# Patient Record
Sex: MALE | Race: WHITE | Employment: FULL TIME | URBAN - METROPOLITAN AREA
[De-identification: names, ages, dates, MRNs, and addresses within clinical notes are randomized per-mention and may not be internally consistent; named-entity substitution may affect disease eponyms.]

---

## 2021-01-22 ENCOUNTER — OFFICE VISIT (OUTPATIENT)
Dept: ENT CLINIC | Age: 26
End: 2021-01-22
Payer: COMMERCIAL

## 2021-01-22 ENCOUNTER — PROCEDURE VISIT (OUTPATIENT)
Dept: AUDIOLOGY | Age: 26
End: 2021-01-22
Payer: COMMERCIAL

## 2021-01-22 VITALS
TEMPERATURE: 96.8 F | HEART RATE: 97 BPM | WEIGHT: 140 LBS | BODY MASS INDEX: 22.5 KG/M2 | SYSTOLIC BLOOD PRESSURE: 139 MMHG | DIASTOLIC BLOOD PRESSURE: 71 MMHG | HEIGHT: 66 IN

## 2021-01-22 DIAGNOSIS — H93.11 TINNITUS, RIGHT EAR: ICD-10-CM

## 2021-01-22 DIAGNOSIS — H93.11 TINNITUS OF RIGHT EAR: ICD-10-CM

## 2021-01-22 DIAGNOSIS — H90.41 SENSORINEURAL HEARING LOSS (SNHL) OF RIGHT EAR WITH UNRESTRICTED HEARING OF LEFT EAR: Primary | ICD-10-CM

## 2021-01-22 DIAGNOSIS — H91.21 SUDDEN IDIOPATHIC HEARING LOSS OF RIGHT EAR WITH UNRESTRICTED HEARING OF LEFT EAR: Primary | ICD-10-CM

## 2021-01-22 PROCEDURE — 92550 TYMPANOMETRY & REFLEX THRESH: CPT | Performed by: AUDIOLOGIST

## 2021-01-22 PROCEDURE — 92557 COMPREHENSIVE HEARING TEST: CPT | Performed by: AUDIOLOGIST

## 2021-01-22 PROCEDURE — 99203 OFFICE O/P NEW LOW 30 MIN: CPT | Performed by: OTOLARYNGOLOGY

## 2021-01-22 RX ORDER — METHYLPREDNISOLONE 4 MG/1
TABLET ORAL
COMMUNITY
Start: 2021-01-18 | End: 2021-01-29 | Stop reason: ALTCHOICE

## 2021-01-22 RX ORDER — DEXTROAMPHETAMINE SACCHARATE, AMPHETAMINE ASPARTATE, DEXTROAMPHETAMINE SULFATE AND AMPHETAMINE SULFATE 3.75; 3.75; 3.75; 3.75 MG/1; MG/1; MG/1; MG/1
15 TABLET ORAL DAILY
COMMUNITY
End: 2021-01-22

## 2021-01-22 RX ORDER — DEXTROAMPHETAMINE SACCHARATE, AMPHETAMINE ASPARTATE MONOHYDRATE, DEXTROAMPHETAMINE SULFATE AND AMPHETAMINE SULFATE 3.75; 3.75; 3.75; 3.75 MG/1; MG/1; MG/1; MG/1
15 CAPSULE, EXTENDED RELEASE ORAL EVERY MORNING
COMMUNITY
Start: 2021-02-18 | End: 2021-03-20

## 2021-01-22 RX ORDER — PREDNISONE 20 MG/1
TABLET ORAL
Qty: 33 TABLET | Refills: 0 | Status: SHIPPED | OUTPATIENT
Start: 2021-01-22 | End: 2021-02-09 | Stop reason: ALTCHOICE

## 2021-01-22 SDOH — HEALTH STABILITY: MENTAL HEALTH: HOW MANY STANDARD DRINKS CONTAINING ALCOHOL DO YOU HAVE ON A TYPICAL DAY?: NOT ASKED

## 2021-01-22 SDOH — HEALTH STABILITY: MENTAL HEALTH: HOW OFTEN DO YOU HAVE A DRINK CONTAINING ALCOHOL?: NOT ASKED

## 2021-01-22 ASSESSMENT — ENCOUNTER SYMPTOMS
SHORTNESS OF BREATH: 0
SORE THROAT: 0
TROUBLE SWALLOWING: 0
FACIAL SWELLING: 0
EYE ITCHING: 0
APNEA: 0
COUGH: 0
VOICE CHANGE: 0
SINUS PRESSURE: 0

## 2021-01-22 NOTE — PROGRESS NOTES
Alexa Baker   1995, 30 y.o. male   [id number]       Referring Provider: Consuelo Ly DO  Referral Type: In an order in [address] [place] [address]    Reason for Visit: Evaluation of suspected change in hearing and tinnitus. ADULT AUDIOLOGIC EVALUATION      Alexa Baker is a 30 y.o. male seen today, 1/22/2021 , for an initial audiologic evaluation. Patient was seen by Consuelo Ly DO following today's evaluation. AUDIOLOGIC AND OTHER PERTINENT MEDICAL HISTORY:      Alexa Baker noted sudden decrease in hearing and onset of tinnitus in the right ear ~1 week ago. Patient notes no improvement of symptoms with medical management recommended by primary gisele physician. He denies concerns with left ear at this time. No additional significant otologic or medical history was reported. Alexa Baker denied otalgia, aural fullness, otorrhea, dizziness, imbalance, history of falls, history of occupational/recreational noise exposure, history of head trauma, history of ear surgery and family history of hearing loss. Date: 1/22/2021     IMPRESSIONS:      Today's results revealed asymmetric sensorineural hearing loss and word recognition with right (0%) worse than left (100%). Asymmetries > 10 dBHL noted from 250-8000Hz. Acoustic reflex pattern consistent with sensorineural hearing loss of the right ear. Follow medical recommendations of Consuelo Ly DO.     ASSESSMENT AND FINDINGS:     Otoscopy revealed: Clear ear canals bilaterally    RIGHT EAR:  Hearing Sensitivity: Profound sensorineural hearing loss through 2kHz rising to severe sensorineural hearing loss from 3-8kHz. Speech Recognition Threshold: 85 dB HL  Word Recognition: Very Poor (0%), based on NU-6 25-word list at 105 dBHL masked (limits of audiometer) using recorded speech stimuli. Tympanometry: Normal peak pressure and compliance, Type A tympanogram, consistent with normal middle ear function. Acoustic Reflexes: Ipsilateral: Absent.  Contralateral: Present at normal sensation levels. LEFT EAR:  Hearing Sensitivity: Within normal limits. Speech Recognition Threshold: 5 dB HL  Word Recognition: Excellent (100%), based on NU-6 25-word list at 50 dBHL using recorded speech stimuli. Tympanometry: Normal peak pressure and compliance, Type A tympanogram, consistent with normal middle ear function. Acoustic Reflexes: Ipsilateral: Present at normal sensation levels. Contralateral: Absent. Reliability: Good  Transducer: Inserts    **NOTE: Asymmetries > 10 dBHL noted from 250-8000Hz and in worse recognition with right ear worse than left. Acoustic reflex pattern consistent with sensorineural hearing loss of the right ear. See scanned audiogram dated 1/22/2021  for results. PATIENT EDUCATION:       The following items were discussed with the patient:    - Good Communication Strategies    - Tinnitus Management Strategies    Educational information was shared in the After Visit Summary. RECOMMENDATIONS:                                                                                                                                                                                                                                                            The following items are recommended based on patient report and results from today's appointment:   - Continue medical follow-up with Nancy Mast DO.   - Retest hearing as medically indicated and/or sooner if a change in hearing is noted. - Utilize \"Good Communication Strategies\" as discussed to assist in speech understanding with communication partners. - Maintain a sound enriched environment to assist in the management of tinnitus symptoms.          Rasta Robb  Audiologist    Chart CC'd to: Nancy Mast DO      Degree of   Hearing Sensitivity dB Range   Within Normal Limits (WNL) 0 - 20   Mild 20 - 40   Moderate 40 - 55   Moderately-Severe 55 - 70   Severe

## 2021-01-22 NOTE — PATIENT INSTRUCTIONS
Good Communication Strategies    Communication can be challenging for anyone, but can be especially difficult for those with some degree of hearing loss. While we may not be able to control every factor that may lead to difficulty with communication, there are Good Communication Strategies that we can all use in our day-to-day lives. Communication takes both parties working together for it to be successful. Tips as a Listener:   1. Control your environment. It is important to limit the amount of background noise in the room when possible. You should also consider having a good light source in the room to best see the other person. 2. Ask for clarification. Instead of saying \"What?\", you can use parts of what you heard to make a new question. For example, if you heard the word \"Thursday\" but not the rest of the week, you may ask \"What was that about Thursday? \" or \"What did you want to do Thursday? \". This shows the person talking that you are listening and will help them better explain what they are saying. 3. Be an advocate for yourself. If you are hearing but not understanding, tell the other person \"I can hear you, but I need you to slow down when you speak. \"  Or if someone is facing the other direction, say \"I cannot hear you when you are not looking at me when we talk. \"       Tips as a Talker:   - Sit or stand 3 to 6 feet away to maximize audibility         -- It is unrealistic to believe someone else will fully hear your message if you are speaking from across the room or in a different room in the house   - Stay at eye level to help with visual cues   - Make sure you have the persons attention before speaking   - Use facial expressions and gestures to accentuate your message   - Raise your voice slightly (do not scream)   - Speak slowly and distinctly   - Use short, simple sentences   - Rephrase your words if the person is having a hard time understanding you    - To avoid distortion, dont speak directly into a persons ear      Some additional items that may be helpful:   - Remain patient - this is important for both parties   - Write down items that still cannot be heard/understood. You may write with pen/paper or consider typing/texting on a cell phone or smart device. - If background noise is unavoidable, try to keep yourself in a good position in the room. By sitting at a morales on the side of the restaurant (preferably a corner), it will be easier to communicate than if you were sitting at a table in the middle with background noise surrounding you. Keep yourself positioned away from music speakers or heavy foot traffic. Tinnitus: Overview and Management Strategies          Many people have some ringing sounds in their ears once in a while. You may hear a roar, a hiss, a tinkle, or a buzz. The sound usually lasts only a few minutes. If it goes on all the time, you may have tinnitus. Tinnitus is usually caused by long-term exposure to loud noise. This damages the nerves in the inner ear. It can occur with all types of hearing loss. It may be a symptom of almost any ear problem. Tinnitus may be caused by a buildup of earwax. Or, it may be caused by ear infections or certain medicines (especially antibiotics or large amounts of aspirin). You can also hear noises in your ears because of an injury to the ears, drinking too much alcohol or caffeine, or a medical condition. Other conditions may also contribute to tinnitus, including: head and neck trauma, temporomandibular joint disorder (TMJ), sinus pressure and barometric trauma, traumatic brain injury, metabolic disorders, autoimmune disorders, stress, and high blood pressure. You may need tests to evaluate your hearing and to find causes of long-lasting tinnitus. Your doctor may suggest one or more treatments to help you cope with the tinnitus. You can also do things at home to help reduce symptoms.     Follow-up care is a key

## 2021-01-22 NOTE — Clinical Note
Dr. Courtney Watkins,    Thank you for your referral for audiometric testing on this patient. Please see the scanned audiogram (under \"Media\" tab) and encounter note for details. If you have any questions, or if there is anything else you need, please let me know.       Rasta Sung  Audiologist  ---  [facility] - Audiology

## 2021-01-22 NOTE — PROGRESS NOTES
Cec[address] Blanco [address], [address], [address] [place] [address]  P: [phone]       Patient     Dylan Calzada  1995    ChiefComplaint     Chief Complaint   Patient presents with    Hearing Problem     States has had hearing loss in right ear for a week, states there is constant ringing in right ear. Was given steroid by PCP on Monday. History of Present Illness     Iman Madsen is a 68-year-old male here for evaluation of right-sided hearing loss. Reports 1 week ago sudden loss of hearing in the right ear with the onset of high-pitched ringing. Denies otalgia, otorrhea, vertigo. No previous ear surgery. No preceding upper respiratory infection. No history of sudden hearing loss. Past Medical History     Past Medical History:   Diagnosis Date    Headache     Hearing loss     Tinnitus        Past Surgical History     History reviewed. No pertinent surgical history. Family History     Family History   Problem Relation Age of Onset    Cancer Maternal Uncle         Bladder; testicular       Social History     Social History     Tobacco Use    Smoking status: Former Smoker    Smokeless tobacco: Former User     Types: Snuff     Quit date: 07/2020    Tobacco comment: Didn't smoke for long   Substance Use Topics    Alcohol use: Yes    Drug use: Not on file        Allergies     No Known Allergies    Medications     Current Outpatient Medications   Medication Sig Dispense Refill    [START ON 2/18/2021] amphetamine-dextroamphetamine (ADDERALL XR) 15 MG extended release capsule Take 15 mg by mouth every morning.  methylPREDNISolone (MEDROL DOSEPACK) 4 MG tablet TAKE AS DIRECTED      UNABLE TO FIND Headache medication, unsure of name and has not needed.       predniSONE (DELTASONE) 20 MG tablet Take 3 tabs once a day x 7 days Take 2 tabs once a day x 3 days Take 1 tab once a day x 3 days Take 1 tab every other day until gone 33 tablet 0     No current facility-administered medications for this visit. Review of Systems     Review of Systems   Constitutional: Negative for appetite change, chills, fatigue, fever and unexpected weight change. HENT: Positive for hearing loss and tinnitus. Negative for congestion, ear discharge, ear pain, facial swelling, nosebleeds, postnasal drip, sinus pressure, sneezing, sore throat, trouble swallowing and voice change. Eyes: Negative for itching. Respiratory: Negative for apnea, cough and shortness of breath. Gastrointestinal:        Negative for dysphasia   Endocrine: Negative for cold intolerance and heat intolerance. Musculoskeletal: Negative for myalgias and neck pain. Skin: Negative for rash. Allergic/Immunologic: Negative for environmental allergies. Neurological: Negative for dizziness and headaches. Psychiatric/Behavioral: Negative for confusion, decreased concentration and sleep disturbance. PhysicalExam     Vitals:    01/22/21 1047   BP: 139/71   Site: Right Lower Arm   Position: Sitting   Cuff Size: Small Adult   Pulse: 97   Temp: 96.8 °F (36 °C)   TempSrc: Infrared   Weight: 140 lb (63.5 kg)   Height: 5' 6\" (1.676 m)       Physical Exam  Constitutional:       General: He is not in acute distress. Appearance: He is well-developed. HENT:      Head: Normocephalic and atraumatic. Right Ear: Tympanic membrane, ear canal and external ear normal. No drainage. No middle ear effusion. Tympanic membrane is not bulging. Tympanic membrane has normal mobility. Left Ear: Tympanic membrane, ear canal and external ear normal. No drainage. No middle ear effusion. Tympanic membrane is not bulging. Tympanic membrane has normal mobility. Nose: No septal deviation, mucosal edema or rhinorrhea. Mouth/Throat:      Lips: Pink. Mouth: Mucous membranes are moist.      Tongue: No lesions. Palate: No mass. Pharynx: Uvula midline. Tonsils: No tonsillar exudate. 1+ on the right.  1+ on the left.   Eyes:      Pupils: Pupils are equal, round, and reactive to light. Neck:      Musculoskeletal: Full passive range of motion without pain. Thyroid: No thyroid mass or thyromegaly. Trachea: Trachea and phonation normal.   Cardiovascular:      Pulses: Normal pulses. Pulmonary:      Effort: Pulmonary effort is normal. No accessory muscle usage or respiratory distress. Breath sounds: No stridor. Lymphadenopathy:      Head:      Right side of head: No submental or submandibular adenopathy. Left side of head: No submental or submandibular adenopathy. Cervical: No cervical adenopathy. Right cervical: No superficial, deep or posterior cervical adenopathy. Left cervical: No superficial, deep or posterior cervical adenopathy. Skin:     General: Skin is warm and dry. Neurological:      Mental Status: He is alert and oriented to person, place, and time. Cranial Nerves: No cranial nerve deficit. Coordination: Coordination normal.      Gait: Gait normal.   Psychiatric:         Thought Content: Thought content normal.                 Assessment and Plan     1. Sudden idiopathic hearing loss of right ear with unrestricted hearing of left ear  -Audiogram reviewed-profound right-sided sensorineural hearing loss  -Discussed possible etiologies of sudden sensorineural hearing loss  -Explained that based on severity of sudden loss he is unlikely to return to his baseline but goal is to improve his hearing enough that he may benefit from hearing aid  - Jayy Young, [facility]iologyThe [facility]  - predniSONE (DELTASONE) 20 MG tablet; Take 3 tabs once a day x 7 days Take 2 tabs once a day x 3 days Take 1 tab once a day x 3 days Take 1 tab every other day until gone  Dispense: 33 tablet; Refill: 0    2.  Tinnitus of right ear  -Secondary to hearing loss      Follow-up in 2 weeks with repeat hearing test.  Discussed possible transtympanic steroid injection at next appointment. Bernardino Benavides,   1/22/21      Portions of this note were dictated using Dragon.  There may be linguistic errors secondary to the use of this program.

## 2021-01-27 ENCOUNTER — TELEPHONE (OUTPATIENT)
Dept: ENT CLINIC | Age: 26
End: 2021-01-27

## 2021-01-27 NOTE — TELEPHONE ENCOUNTER
Dr. Muñoz Comes spoke with patient. Patient is already scheduled for appointment on 01/29/2021 for HT and Possible Steroid injection afterward.

## 2021-01-27 NOTE — TELEPHONE ENCOUNTER
Patient called in stating he has been taking the steroids as directed and feels like hearing has slightly improved, states he hears mumbles as to before he could not hear anything. Patient states he did some research, and is wondering if he should come in before next week to try an injection in the middle ear. Best number to reach patient at is [phone].

## 2021-01-27 NOTE — PROGRESS NOTES
Papito Shaw   1995, 30 y.o. male   [id number]       Referring Provider: Xavier Harrison DO  Referral Type: In an order in [address] [place] [address]    Reason for Visit: Determination of the effect of medication, surgery, or other treatment. ADULT AUDIOLOGIC EVALUATION      Papito Shaw is a 30 y.o. male seen today, 1/29/2021 , for a recheck audiologic evaluation. Previous evaluation from 1/22/2021 viewable in medical record. Patient was seen by Xavier Harrison DO following today's evaluation. AUDIOLOGIC AND OTHER PERTINENT MEDICAL HISTORY:      Papito Shaw noted slight improvement in hearing and tinnitus of the right ear following medical management for sudden change in hearing recommended by Xavier Harrison DO. No additional changes to otologic or medical health since previous evaluation were reported. Papito Shaw denied otalgia, aural fullness, otorrhea, dizziness, imbalance, history of falls, history of occupational/recreational noise exposure, history of head trauma, history of ear surgery and family history of hearing loss. Date: 1/29/2021     IMPRESSIONS:      Today's results revealed asymmetric sensorineural hearing loss and word recognition with right (0%) worse than left (100%). Asymmetries > 10 dBHL noted from 250-8000Hz. Slight improvement in hearing noted from 2-3kHz in the right ear compared to previous 1/22/2021 audiogram. Follow medical recommendations of Xavier Harrison DO.     ASSESSMENT AND FINDINGS:     Otoscopy revealed: Clear ear canals bilaterally    RIGHT EAR:  Hearing Sensitivity: Severe sensorineural hearing loss through [facility] rising to moderately-severe hearing loss at [facility]. Speech Recognition Threshold: 90 dB HL  Word Recognition: Very Poor (0%), based on NU-6 25-word list at 105 dBHL masked using recorded speech stimuli. Tympanometry: Normal peak pressure and compliance, Type A tympanogram, consistent with normal middle ear function.     LEFT EAR:  Hearing Sensitivity: Within normal limits. Speech Recognition Threshold: 0 dB HL  Word Recognition: Excellent (96%), based on NU-6 25-word list at 50 dBHL using recorded speech stimuli. Tympanometry: Normal peak pressure and compliance, Type A tympanogram, consistent with normal middle ear function. Reliability: Good  Transducer: Inserts    **NOTE: Asymmetries > 10 dBHL noted from 250-8000Hz and in word recognition abilities with right ear (0%) worse than left (96%). Slight improvement in hearing noted from 2-3kHz in the right ear compared to previous 1/22/2021 audiogram.     See scanned audiogram dated 1/29/2021  for results. PATIENT EDUCATION:       The following items were discussed with the patient:    - Good Communication Strategies    - Tinnitus Management Strategies    Educational information was shared in the After Visit Summary. RECOMMENDATIONS:                                                                                                                                                                                                                                                            The following items are recommended based on patient report and results from today's appointment:   - Continue medical follow-up with Matilda Sacks, DO.   - Retest hearing as medically indicated and/or sooner if a change in hearing is noted. - Utilize \"Good Communication Strategies\" as discussed to assist in speech understanding with communication partners   - Maintain a sound enriched environment to assist in the management of tinnitus symptoms.          Rasta Noble  Audiologist    Chart CC'd to: Matilda Sacks, DO      Degree of   Hearing Sensitivity dB Range   Within Normal Limits (WNL) 0 - 20   Mild 20 - 40   Moderate 40 - 55   Moderately-Severe 55 - 70   Severe 70 - 90   Profound 90 +

## 2021-01-29 ENCOUNTER — PROCEDURE VISIT (OUTPATIENT)
Dept: AUDIOLOGY | Age: 26
End: 2021-01-29
Payer: COMMERCIAL

## 2021-01-29 ENCOUNTER — PROCEDURE VISIT (OUTPATIENT)
Dept: ENT CLINIC | Age: 26
End: 2021-01-29
Payer: COMMERCIAL

## 2021-01-29 VITALS — WEIGHT: 140 LBS | BODY MASS INDEX: 22.5 KG/M2 | HEIGHT: 66 IN | TEMPERATURE: 97 F

## 2021-01-29 DIAGNOSIS — H90.41 SENSORINEURAL HEARING LOSS (SNHL) OF RIGHT EAR WITH UNRESTRICTED HEARING OF LEFT EAR: Primary | ICD-10-CM

## 2021-01-29 DIAGNOSIS — H91.21 SUDDEN RIGHT HEARING LOSS: Primary | ICD-10-CM

## 2021-01-29 DIAGNOSIS — H93.11 TINNITUS, RIGHT EAR: ICD-10-CM

## 2021-01-29 PROCEDURE — 92557 COMPREHENSIVE HEARING TEST: CPT | Performed by: AUDIOLOGIST

## 2021-01-29 PROCEDURE — 92567 TYMPANOMETRY: CPT | Performed by: AUDIOLOGIST

## 2021-01-29 PROCEDURE — 69801 INCISE INNER EAR: CPT | Performed by: OTOLARYNGOLOGY

## 2021-01-29 RX ORDER — PREDNISONE 20 MG/1
60 TABLET ORAL DAILY
Qty: 21 TABLET | Refills: 0 | Status: SHIPPED | OUTPATIENT
Start: 2021-01-29 | End: 2021-02-05

## 2021-01-29 NOTE — PATIENT INSTRUCTIONS
Good Communication Strategies    Communication can be challenging for anyone, but can be especially difficult for those with some degree of hearing loss. While we may not be able to control every factor that may lead to difficulty with communication, there are Good Communication Strategies that we can all use in our day-to-day lives. Communication takes both parties working together for it to be successful. Tips as a Listener:   1. Control your environment. It is important to limit the amount of background noise in the room when possible. You should also consider having a good light source in the room to best see the other person. 2. Ask for clarification. Instead of saying \"What?\", you can use parts of what you heard to make a new question. For example, if you heard the word \"Thursday\" but not the rest of the week, you may ask \"What was that about Thursday? \" or \"What did you want to do Thursday? \". This shows the person talking that you are listening and will help them better explain what they are saying. 3. Be an advocate for yourself. If you are hearing but not understanding, tell the other person \"I can hear you, but I need you to slow down when you speak. \"  Or if someone is facing the other direction, say \"I cannot hear you when you are not looking at me when we talk. \"       Tips as a Talker:   - Sit or stand 3 to 6 feet away to maximize audibility         -- It is unrealistic to believe someone else will fully hear your message if you are speaking from across the room or in a different room in the house   - Stay at eye level to help with visual cues   - Make sure you have the persons attention before speaking   - Use facial expressions and gestures to accentuate your message   - Raise your voice slightly (do not scream)   - Speak slowly and distinctly   - Use short, simple sentences   - Rephrase your words if the person is having a hard time understanding you    - To avoid distortion, dont speak directly into a persons ear      Some additional items that may be helpful:   - Remain patient - this is important for both parties   - Write down items that still cannot be heard/understood. You may write with pen/paper or consider typing/texting on a cell phone or smart device. - If background noise is unavoidable, try to keep yourself in a good position in the room. By sitting at a morales on the side of the restaurant (preferably a corner), it will be easier to communicate than if you were sitting at a table in the middle with background noise surrounding you. Keep yourself positioned away from music speakers or heavy foot traffic. Tinnitus: Overview and Management Strategies          Many people have some ringing sounds in their ears once in a while. You may hear a roar, a hiss, a tinkle, or a buzz. The sound usually lasts only a few minutes. If it goes on all the time, you may have tinnitus. Tinnitus is usually caused by long-term exposure to loud noise. This damages the nerves in the inner ear. It can occur with all types of hearing loss. It may be a symptom of almost any ear problem. Tinnitus may be caused by a buildup of earwax. Or, it may be caused by ear infections or certain medicines (especially antibiotics or large amounts of aspirin). You can also hear noises in your ears because of an injury to the ears, drinking too much alcohol or caffeine, or a medical condition. Other conditions may also contribute to tinnitus, including: head and neck trauma, temporomandibular joint disorder (TMJ), sinus pressure and barometric trauma, traumatic brain injury, metabolic disorders, autoimmune disorders, stress, and high blood pressure. You may need tests to evaluate your hearing and to find causes of long-lasting tinnitus. Your doctor may suggest one or more treatments to help you cope with the tinnitus. You can also do things at home to help reduce symptoms.     Follow-up care is a key part of your treatment and safety. Be sure to make and go to all appointments, and call your doctor if you are having problems. It's also a good idea to know your test results and keep a list of the medicines you take. How can you care for yourself at home? · Limit or cut out alcohol, caffeine, and sodium. They can make your symptoms worse. · Do not smoke or use other tobacco products. Nicotine reduces blood flow to the ear and makes tinnitus worse. If you need help quitting, talk to your doctor about stop-smoking programs and medicines. These can increase your chances of quitting for good. · Talk to your doctor about whether to stop taking aspirin and similar products such as ibuprofen or naproxen. · Get exercise often. It can help improve blood flow to the ear. Ways to manage/cope with tinnitus  Some tinnitus may last a long time. To manage your tinnitus, try to:  · Avoid noises that you think caused your tinnitus. If you can't avoid loud noises, wear earplugs or earmuffs. · Ignore the sound by paying attention to other things. Keeping your brain busy with other tasks or background noise can help your brain not focus on the tinnitus. · Try to not give the tinnitus an emotional reaction. Do your best to ignore the sound and not let it bother you. Relax using biofeedback, meditation, or yoga. Feeling stressed and being tired can make tinnitus worse. · Play music or white noise to help you sleep. Background noise may cover up the noise that you hear in your ears. You can buy a tabletop machine or a device that sits under your pillow to play soothing sounds, like ocean waves. · Smart phones have free apps, such as Whist, Relax Melodies, ReSound Relief, and White Noise Lite. These apps have different types of sounds/noise, some of which you can blend together to find sounds that are most soothing to you.   · Hearing aid technology, especially when there is some hearing loss, may help reduce tinnitus symptoms by giving your brain better access to the sounds it is missing. There are some hearing aids with built-in noise generator programs, which may help when amplification alone is not enough. Additional resources may be found through the [employer] at www.rubens.org    When should you call for help? Call 911 anytime you think you may need emergency care. For example, call if:    · You have symptoms of a stroke. These may include:  ? Sudden numbness, tingling, weakness, or loss of movement in your face, arm, or leg, especially on only one side of your body. ? Sudden vision changes. ? Sudden trouble speaking. ? Sudden confusion or trouble understanding simple statements. ? Sudden problems with walking or balance. ? A sudden, severe headache that is different from past headaches. Call your doctor now or seek immediate medical care if:    · You develop other symptoms. These may include hearing loss (or worse hearing loss), balance problems, dizziness, nausea, or vomiting. Watch closely for changes in your health, and be sure to contact your doctor if:    · Your tinnitus moves from both ears to one ear. · Your hearing loss gets worse within 1 day after an ear injury. · Your tinnitus or hearing loss does not get better within 1 week after an ear injury. · Your tinnitus bothers you enough that you want to take medicines to help you cope with it. If you notice changes in your tinnitus and/or your hearing, it is recommended that you have your hearing tested by your audiologist and to follow-up with your physician that manages your hearing loss (such as your ENT or Primary Care doctor).

## 2021-01-29 NOTE — PROGRESS NOTES
Yosi Gloria notes subjective improvement in hearing since last appointment 1 week ago. Repeat hearing test showed slight improvement versus intertest variability change in hearing. Notes he is now able to hear the kitchen sink when running. Discussed Academy recommendations for sensorineural hearing loss. Explored options of continued observation, extension of high-dose steroid taper for 1 week and transtympanic steroid injection. He wishes to proceed with extension of oral steroid and transtympanic steroid injection with follow-up in 1 week. MRI of IAC also ordered to evaluate for retrocochlear pathology as the cause. Transtympanic Dexamethasone Injection    Preop: sudden sensorineural hearing loss  Surgeon: David Keene DO  Consent: written obtained  Anes: topical phenol  Description: The patient was placed in a supine position. The right ear was examined under otomicroscopy. Topical phenol was applied to the superior aspect of the right tympanic membrane. Then, 1cc of 10mg/1ml of warmed Dexamethasone was injected into the middle ear using a 27 gauge needle. The patient was instructed to remain in the supine position without swallowing for 10 minutes. The patient tolerated the procedure well. There were no complications with the procedure.

## 2021-01-29 NOTE — Clinical Note
Dr. Felice Rees,    Thank you for your referral for audiometric testing on this patient. Please see the scanned audiogram (under \"Media\" tab) and encounter note for details. If you have any questions, or if there is anything else you need, please let me know.       Rasta Jara  Audiologist  ---  [address]  [facility] ENT - Audiology

## 2021-01-30 NOTE — PROGRESS NOTES
Mary Kaur   1995, 30 y.o. male   [id number]       Referring Provider: John Darden DO  Referral Type: In an order in [address] [place] [address]    Reason for Visit: Determination of the effect of medication, surgery, or other treatment. ADULT AUDIOLOGIC EVALUATION      Mary Kaur is a 30 y.o. male seen today, 2/5/2021 , for a recheck audiologic evaluation. Previous evaluations from 1/22/21 and 1/29/21 viewable in medical record. Patient was seen by John Darden DO following today's evaluation. AUDIOLOGIC AND OTHER PERTINENT MEDICAL HISTORY:      Mary Kaur noted no in hearing and tinnitus of the right ear following medical management recommended by John Darden DO. No additional changes to otologic or medical health since previous evaluation were reported.      Carmelo Rocha denied otalgia, aural fullness, otorrhea, dizziness, imbalance, history of falls, history of occupational/recreational noise exposure, history of head trauma, history of ear surgery and family history of hearing loss. Date: 2/5/2021     IMPRESSIONS:      Today's results revealed asymmetric sensorineural hearing loss and word recognition with right (28%) worse than left (100%). Asymmetries > 10 dBHL noted from 250-8000Hz. Slight improvement in hearing noted from 250-3000Hz in the right ear compared to original 1/22/2021 audiogram. Follow medical recommendations of John Darden DO.     ASSESSMENT AND FINDINGS:     Otoscopy revealed: Clear ear canals bilaterally    RIGHT EAR:  Hearing Sensitivity: Severe through 2kHz rising to moderately-severe sensorineural hearing loss through [facility]. Speech Recognition Threshold: 85 dB HL  Word Recognition: Excellent (28%), based on NU-6 25-word list at 105 dBHL masked (limits of audiometer) using recorded speech stimuli. Tympanometry: Normal peak pressure and compliance, Type A tympanogram, consistent with normal middle ear function. LEFT EAR:  Hearing Sensitivity: Within normal limits.   Speech Recognition Threshold: 5 dB HL  Word Recognition: Excellent (100%), based on NU-6 25-word list at 50 dBHL using recorded speech stimuli. Tympanometry: Normal peak pressure and compliance, Type A tympanogram, consistent with normal middle ear function. Reliability: Good  Transducer: Inserts    **NOTE: Asymmetries > 10 dBHL noted from 250-8000Hz and in worse recognition with right ear (28%) worse than left (100%). Slight improvement in hearing noted from 250-3000Hz in the right ear compared to originals 1/22/2021 audiogram.    See scanned audiogram dated 2/5/2021  for results. PATIENT EDUCATION:       The following items were discussed with the patient:    - Good Communication Strategies    - Tinnitus Management Strategies    Educational information was shared in the After Visit Summary. RECOMMENDATIONS:                                                                                                                                                                                                                                                          The following items are recommended based on patient report and results from today's appointment:   - Continue medical follow-up with Bernardino Benavides DO.   - Retest hearing as medically indicated and/or sooner if a change in hearing is noted. - Utilize \"Good Communication Strategies\" as discussed to assist in speech understanding with communication partners. - Maintain a sound enriched environment to assist in the management of tinnitus symptoms.          Rasta Sampson  Audiologist    Chart CC'd to: Bernardino Benavides DO      Degree of   Hearing Sensitivity dB Range   Within Normal Limits (WNL) 0 - 20   Mild 20 - 40   Moderate 40 - 55   Moderately-Severe 55 - 70   Severe 70 - 90   Profound 90 +

## 2021-02-05 ENCOUNTER — PROCEDURE VISIT (OUTPATIENT)
Dept: ENT CLINIC | Age: 26
End: 2021-02-05
Payer: COMMERCIAL

## 2021-02-05 ENCOUNTER — PROCEDURE VISIT (OUTPATIENT)
Dept: AUDIOLOGY | Age: 26
End: 2021-02-05
Payer: COMMERCIAL

## 2021-02-05 VITALS — WEIGHT: 140 LBS | BODY MASS INDEX: 22.5 KG/M2 | HEIGHT: 66 IN | TEMPERATURE: 97.2 F

## 2021-02-05 DIAGNOSIS — H93.11 TINNITUS, RIGHT EAR: ICD-10-CM

## 2021-02-05 DIAGNOSIS — H91.21 SUDDEN RIGHT HEARING LOSS: Primary | ICD-10-CM

## 2021-02-05 DIAGNOSIS — H90.41 SENSORINEURAL HEARING LOSS (SNHL) OF RIGHT EAR WITH UNRESTRICTED HEARING OF LEFT EAR: Primary | ICD-10-CM

## 2021-02-05 PROCEDURE — 92567 TYMPANOMETRY: CPT | Performed by: AUDIOLOGIST

## 2021-02-05 PROCEDURE — 69801 INCISE INNER EAR: CPT | Performed by: OTOLARYNGOLOGY

## 2021-02-05 PROCEDURE — 92557 COMPREHENSIVE HEARING TEST: CPT | Performed by: AUDIOLOGIST

## 2021-02-05 RX ORDER — DEXAMETHASONE SODIUM PHOSPHATE 100 MG/10ML
10 INJECTION INTRAMUSCULAR; INTRAVENOUS ONCE
Status: COMPLETED | OUTPATIENT
Start: 2021-02-05 | End: 2021-02-05

## 2021-02-05 RX ADMIN — DEXAMETHASONE SODIUM PHOSPHATE 10 MG: 100 INJECTION INTRAMUSCULAR; INTRAVENOUS at 16:27

## 2021-02-05 RX ADMIN — DEXAMETHASONE SODIUM PHOSPHATE 10 MG: 100 INJECTION INTRAMUSCULAR; INTRAVENOUS at 14:53

## 2021-02-05 NOTE — Clinical Note
Dr. Jose Way,    Thank you for your referral for audiometric testing on this patient. Please see the scanned audiogram (under \"Media\" tab) and encounter note for details. If you have any questions, or if there is anything else you need, please let me know.       Rasta Pritchett  Audiologist  ---  [address]  [facility] ENT - Audiology

## 2021-02-05 NOTE — PROGRESS NOTES
[facility], [address], [address], [facility] David Menjivar  P: [phone]       Patient     Celsa Ash  1995    ChiefComplaint     Chief Complaint   Patient presents with    Hearing Problem     Patient here for right sided hearing loss. He states he does not feel any improvement since his last visit. History of Present Illness     Ranulfo Fuller is a 24-year-old male here today for second transtympanic steroid injection. Notes subjectively improvement in his hearing. Past Medical History     Past Medical History:   Diagnosis Date    Headache     Hearing loss     Tinnitus        Past Surgical History     History reviewed. No pertinent surgical history. Family History     Family History   Problem Relation Age of Onset    Cancer Maternal Uncle         Bladder; testicular       Social History     Social History     Tobacco Use    Smoking status: Former Smoker    Smokeless tobacco: Former User     Types: Snuff     Quit date: 07/2020    Tobacco comment: Didn't smoke for long   Substance Use Topics    Alcohol use: Yes    Drug use: Not on file        Allergies     No Known Allergies    Medications     Current Outpatient Medications   Medication Sig Dispense Refill    predniSONE (DELTASONE) 20 MG tablet Take 3 tablets by mouth daily for 7 days 21 tablet 0    [START ON 2/18/2021] amphetamine-dextroamphetamine (ADDERALL XR) 15 MG extended release capsule Take 15 mg by mouth every morning.  UNABLE TO FIND Headache medication, unsure of name and has not needed.       predniSONE (DELTASONE) 20 MG tablet Take 3 tabs once a day x 7 days Take 2 tabs once a day x 3 days Take 1 tab once a day x 3 days Take 1 tab every other day until gone 33 tablet 0     Current Facility-Administered Medications   Medication Dose Route Frequency Provider Last Rate Last Admin    dexamethasone (DECADRON) injection 10 mg  10 mg Other Once [place] Kennedy, DO           Review of Systems       Right hearing

## 2021-02-09 DIAGNOSIS — H91.21 SUDDEN RIGHT HEARING LOSS: Primary | ICD-10-CM

## 2021-02-09 RX ORDER — PREDNISONE 20 MG/1
TABLET ORAL
Qty: 5 TABLET | Refills: 0 | Status: SHIPPED | OUTPATIENT
Start: 2021-02-09

## 2021-02-09 NOTE — TELEPHONE ENCOUNTER
Patient is out of prednisone and is wondering if Dr Sohan Swenson can refill them for him, he had enough for today. Patient can be reached at [phone].

## 2021-02-12 ENCOUNTER — PROCEDURE VISIT (OUTPATIENT)
Dept: ENT CLINIC | Age: 26
End: 2021-02-12
Payer: COMMERCIAL

## 2021-02-12 VITALS — TEMPERATURE: 97.2 F

## 2021-02-12 DIAGNOSIS — H91.21 SUDDEN RIGHT HEARING LOSS: Primary | ICD-10-CM

## 2021-02-12 DIAGNOSIS — H93.11 TINNITUS OF RIGHT EAR: ICD-10-CM

## 2021-02-12 PROCEDURE — 69801 INCISE INNER EAR: CPT | Performed by: OTOLARYNGOLOGY

## 2021-02-12 RX ORDER — DEXAMETHASONE SODIUM PHOSPHATE 100 MG/10ML
10 INJECTION INTRAMUSCULAR; INTRAVENOUS ONCE
Status: COMPLETED | OUTPATIENT
Start: 2021-02-12 | End: 2021-02-12

## 2021-02-12 RX ADMIN — DEXAMETHASONE SODIUM PHOSPHATE 10 MG: 100 INJECTION INTRAMUSCULAR; INTRAVENOUS at 09:05

## 2021-02-12 NOTE — PROGRESS NOTES
Jelena Dee is here today for repeat transtympanic steroid injection in the right ear. Reports improvement in tinnitus since last appointment but no significant change in hearing. Audiogram not obtained today due to audiologist not being available. Has appointment scheduled with otologist on 2/17/2021. Did have MRI performed. 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 Transtympanic Dexamethasone Injection    Preop: sudden sensorineural hearing loss  Surgeon: Pro Griffin DO  Consent: written obtained  Anes: topical phenol  Description: The patient was placed in a supine position. The right ear was examined under otomicroscopy. Topical phenol was applied to the superior aspect of the right tympanic membrane. Then, 1cc of 10mg/1ml of warmed Dexamethasone was injected into the middle ear using a 27 gauge needle. The patient was instructed to remain in the supine position without swallowing for 5 minutes. The patient tolerated the procedure well. There were no complications with the procedure. Patient has follow-up planned with otologist on 2/17/2021. He will reach out should he need anything additional from us. He will have otologist notes sent to us.     Pro Griffin DO  Otolaryngology